# Patient Record
Sex: MALE | Race: WHITE | URBAN - METROPOLITAN AREA
[De-identification: names, ages, dates, MRNs, and addresses within clinical notes are randomized per-mention and may not be internally consistent; named-entity substitution may affect disease eponyms.]

---

## 2022-07-09 ENCOUNTER — TELEPHONE ENCOUNTER (OUTPATIENT)
Dept: URBAN - METROPOLITAN AREA CLINIC 121 | Facility: CLINIC | Age: 37
End: 2022-07-09

## 2022-07-10 ENCOUNTER — TELEPHONE ENCOUNTER (OUTPATIENT)
Dept: URBAN - METROPOLITAN AREA CLINIC 121 | Facility: CLINIC | Age: 37
End: 2022-07-10

## 2022-07-10 RX ORDER — SUCRALFATE 1 G/1
TAKE 1 TABLET PO TIDAC AND HS TABLET ORAL
Refills: 6 | Status: ACTIVE | COMMUNITY
Start: 2008-03-28

## 2023-07-31 ENCOUNTER — APPOINTMENT (RX ONLY)
Dept: URBAN - METROPOLITAN AREA CLINIC 333 | Facility: CLINIC | Age: 38
Setting detail: DERMATOLOGY
End: 2023-07-31

## 2023-07-31 DIAGNOSIS — L21.8 OTHER SEBORRHEIC DERMATITIS: ICD-10-CM | Status: INADEQUATELY CONTROLLED

## 2023-07-31 PROCEDURE — ? PRESCRIPTION

## 2023-07-31 PROCEDURE — 99204 OFFICE O/P NEW MOD 45 MIN: CPT

## 2023-07-31 PROCEDURE — ? COUNSELING

## 2023-07-31 PROCEDURE — ? PRESCRIPTION MEDICATION MANAGEMENT

## 2023-07-31 PROCEDURE — ? ADDITIONAL NOTES

## 2023-07-31 RX ORDER — KETOCONAZOLE 20 MG/G
AS DIRECTED CREAM TOPICAL TIW
Qty: 60 | Refills: 6 | Status: ERX | COMMUNITY
Start: 2023-07-31

## 2023-07-31 RX ORDER — SODIUM SULFACETAMIDE AND SULFUR 100; 50 MG/ML; MG/ML
AS DIRECTED SOLUTION TOPICAL BID
Qty: 355 | Refills: 6 | Status: CANCELLED | COMMUNITY
Start: 2023-07-31

## 2023-07-31 RX ORDER — CICLOPIROX 10 MG/.96ML
AS DIRECTED SHAMPOO TOPICAL BIW
Qty: 120 | Refills: 6 | Status: CANCELLED | COMMUNITY
Start: 2023-07-31

## 2023-07-31 RX ADMIN — KETOCONAZOLE AS DIRECTED: 20 CREAM TOPICAL at 00:00

## 2023-07-31 RX ADMIN — CICLOPIROX AS DIRECTED: 10 SHAMPOO TOPICAL at 00:00

## 2023-07-31 RX ADMIN — SODIUM SULFACETAMIDE AND SULFUR AS DIRECTED: 100; 50 SOLUTION TOPICAL at 00:00

## 2023-07-31 ASSESSMENT — LOCATION DETAILED DESCRIPTION DERM
LOCATION DETAILED: LEFT POSTERIOR EAR
LOCATION DETAILED: LEFT CENTRAL MALAR CHEEK
LOCATION DETAILED: RIGHT POSTERIOR EARLOBE
LOCATION DETAILED: POSTERIOR MID-PARIETAL SCALP
LOCATION DETAILED: RIGHT CENTRAL MALAR CHEEK

## 2023-07-31 ASSESSMENT — LOCATION SIMPLE DESCRIPTION DERM
LOCATION SIMPLE: RIGHT CHEEK
LOCATION SIMPLE: LEFT CHEEK
LOCATION SIMPLE: LEFT EAR
LOCATION SIMPLE: RIGHT EAR
LOCATION SIMPLE: POSTERIOR SCALP

## 2023-07-31 ASSESSMENT — LOCATION ZONE DERM
LOCATION ZONE: SCALP
LOCATION ZONE: EAR
LOCATION ZONE: FACE

## 2023-07-31 NOTE — PROCEDURE: ADDITIONAL NOTES
Additional Notes: Patient has used ketoconazole shampoo before for months, but he has no improvement with it. He has also tried other OTC shampoo ps p, but nothing seems to help control the condition.
Render Risk Assessment In Note?: no
Detail Level: Simple

## 2023-07-31 NOTE — PROCEDURE: PRESCRIPTION MEDICATION MANAGEMENT
Discontinue Regimen: Ketoconazole shampoo
Detail Level: Zone
Render In Strict Bullet Format?: No
Initiate Treatment: Ketoconazole cream twice a day, sulfa face wash twice a day and ciclopirox shampoo every other day
Continue Regimen: Hydrocortisone for flare ups as needed

## 2023-07-31 NOTE — HPI: RASH
What Type Of Note Output Would You Prefer (Optional)?: Bullet Format
Is This A New Presentation, Or A Follow-Up?: Rash
Additional History: Patient has seen other dermatologist before and he said they told him he has Seborrheic dermatitis. He is currently using ketoconazole shampoo and hydrocortisone cream, but states it doesn’t help.

## 2023-08-09 ENCOUNTER — RX ONLY (OUTPATIENT)
Age: 38
Setting detail: RX ONLY
End: 2023-08-09

## 2023-08-09 RX ORDER — CICLOPIROX 10 MG/.96ML
AS DIRECTED SHAMPOO TOPICAL BIW
Qty: 120 | Refills: 6 | Status: ERX

## 2023-08-09 RX ORDER — SODIUM SULFACETAMIDE AND SULFUR 100; 50 MG/ML; MG/ML
AS DIRECTED SOLUTION TOPICAL BID
Qty: 355 | Refills: 6 | Status: ERX